# Patient Record
Sex: FEMALE | Race: WHITE
[De-identification: names, ages, dates, MRNs, and addresses within clinical notes are randomized per-mention and may not be internally consistent; named-entity substitution may affect disease eponyms.]

---

## 2018-05-09 ENCOUNTER — HOSPITAL ENCOUNTER (EMERGENCY)
Dept: HOSPITAL 94 - ER | Age: 63
Discharge: HOME | End: 2018-05-09
Payer: MEDICAID

## 2018-05-09 VITALS — DIASTOLIC BLOOD PRESSURE: 96 MMHG | SYSTOLIC BLOOD PRESSURE: 148 MMHG

## 2018-05-09 VITALS — BODY MASS INDEX: 20.81 KG/M2 | HEIGHT: 61 IN | WEIGHT: 110.23 LBS

## 2018-05-09 DIAGNOSIS — F07.81: Primary | ICD-10-CM

## 2018-05-09 DIAGNOSIS — Z56.0: ICD-10-CM

## 2018-05-09 DIAGNOSIS — Z88.5: ICD-10-CM

## 2018-05-09 DIAGNOSIS — Z79.899: ICD-10-CM

## 2018-05-09 DIAGNOSIS — Z98.890: ICD-10-CM

## 2018-05-09 PROCEDURE — 99284 EMERGENCY DEPT VISIT MOD MDM: CPT

## 2018-05-09 PROCEDURE — 73000 X-RAY EXAM OF COLLAR BONE: CPT

## 2018-05-09 PROCEDURE — 70450 CT HEAD/BRAIN W/O DYE: CPT

## 2018-05-09 SDOH — ECONOMIC STABILITY - INCOME SECURITY: UNEMPLOYMENT, UNSPECIFIED: Z56.0

## 2019-10-29 ENCOUNTER — HOSPITAL ENCOUNTER (EMERGENCY)
Dept: HOSPITAL 94 - ER | Age: 64
Discharge: HOME | End: 2019-10-29
Payer: MEDICAID

## 2019-10-29 VITALS — HEIGHT: 59 IN | WEIGHT: 110.23 LBS | BODY MASS INDEX: 22.22 KG/M2

## 2019-10-29 VITALS — DIASTOLIC BLOOD PRESSURE: 82 MMHG | SYSTOLIC BLOOD PRESSURE: 150 MMHG
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
## 2019-10-29 DIAGNOSIS — F10.99: ICD-10-CM
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
## 2019-10-29 DIAGNOSIS — Z79.899: ICD-10-CM

## 2019-10-29 DIAGNOSIS — S82.092A: Primary | ICD-10-CM
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
## 2019-10-29 DIAGNOSIS — Z56.0: ICD-10-CM

## 2019-10-29 DIAGNOSIS — Z88.5: ICD-10-CM
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
## 2019-10-29 DIAGNOSIS — S60.211A: ICD-10-CM

## 2019-10-29 PROCEDURE — 99284 EMERGENCY DEPT VISIT MOD MDM: CPT

## 2019-10-29 PROCEDURE — 73564 X-RAY EXAM KNEE 4 OR MORE: CPT

## 2019-10-29 PROCEDURE — 29505 APPLICATION LONG LEG SPLINT: CPT

## 2019-10-29 SDOH — ECONOMIC STABILITY - INCOME SECURITY: UNEMPLOYMENT, UNSPECIFIED: Z56.0

## 2019-11-12 ENCOUNTER — HOSPITAL ENCOUNTER (OUTPATIENT)
Dept: HOSPITAL 94 - ORTHO | Age: 64
Discharge: HOME | End: 2019-11-12
Attending: ORTHOPAEDIC SURGERY
Payer: MEDICAID

## 2019-11-12 DIAGNOSIS — F17.200: ICD-10-CM
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
## 2019-11-12 DIAGNOSIS — M11.262: ICD-10-CM

## 2019-11-12 DIAGNOSIS — S82.032D: Primary | ICD-10-CM

## 2019-11-12 DIAGNOSIS — I10: ICD-10-CM

## 2019-11-12 PROCEDURE — 73564 X-RAY EXAM KNEE 4 OR MORE: CPT

## 2019-12-17 ENCOUNTER — HOSPITAL ENCOUNTER (OUTPATIENT)
Dept: HOSPITAL 94 - ORTHO | Age: 64
Discharge: HOME | End: 2019-12-17
Attending: ORTHOPAEDIC SURGERY
Payer: MEDICAID

## 2019-12-17 DIAGNOSIS — S82.032D: Primary | ICD-10-CM

## 2019-12-17 DIAGNOSIS — F17.200: ICD-10-CM
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
## 2019-12-17 DIAGNOSIS — I10: ICD-10-CM

## 2019-12-17 DIAGNOSIS — M11.262: ICD-10-CM

## 2019-12-17 PROCEDURE — 73564 X-RAY EXAM KNEE 4 OR MORE: CPT

## 2020-01-14 ENCOUNTER — HOSPITAL ENCOUNTER (OUTPATIENT)
Dept: HOSPITAL 94 - ORTHO | Age: 65
Discharge: HOME | End: 2020-01-14
Attending: ORTHOPAEDIC SURGERY
Payer: MEDICAID
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
## 2020-01-14 DIAGNOSIS — M79.89: ICD-10-CM

## 2020-01-14 DIAGNOSIS — M17.11: ICD-10-CM

## 2020-01-14 DIAGNOSIS — S82.001D: Primary | ICD-10-CM

## 2020-01-14 DIAGNOSIS — M25.461: ICD-10-CM

## 2020-01-14 PROCEDURE — 73564 X-RAY EXAM KNEE 4 OR MORE: CPT

## 2020-08-03 LAB
ALBUMIN SERPL BCP-MCNC: 4.1 G/DL (ref 3.4–5)
ALBUMIN/GLOB SERPL: 1.3 {RATIO} (ref 1.1–1.5)
ALP SERPL-CCNC: 65 IU/L (ref 46–116)
ALT SERPL W P-5'-P-CCNC: 19 U/L (ref 30–65)
ANION GAP SERPL CALCULATED.3IONS-SCNC: 9 MMOL/L (ref 8–16)
AST SERPL W P-5'-P-CCNC: 34 U/L (ref 10–37)
BASOPHILS # BLD AUTO: 0.1 X10'3 (ref 0–0.2)
BASOPHILS NFR BLD AUTO: 0.8 % (ref 0–1)
BILIRUB SERPL-MCNC: 0.5 MG/DL (ref 0–1)
BUN SERPL-MCNC: 12 MG/DL (ref 7–18)
BUN/CREAT SERPL: 15.6 (ref 6.6–38)
CALCIUM SERPL-MCNC: 9 MG/DL (ref 8.5–10.1)
CHLORIDE SERPL-SCNC: 103 MMOL/L (ref 99–107)
CO2 SERPL-SCNC: 27.6 MMOL/L (ref 24–32)
CREAT SERPL-MCNC: 0.77 MG/DL (ref 0.4–0.9)
EOSINOPHIL # BLD AUTO: 0.1 X10'3 (ref 0–0.9)
EOSINOPHIL NFR BLD AUTO: 0.8 % (ref 0–6)
ERYTHROCYTE [DISTWIDTH] IN BLOOD BY AUTOMATED COUNT: 15.9 % (ref 11.5–14.5)
GFR SERPL CREATININE-BSD FRML MDRD: 76 ML/MIN
GLUCOSE SERPL-MCNC: 77 MG/DL (ref 70–104)
HCT VFR BLD AUTO: 39.7 % (ref 35–45)
HGB BLD-MCNC: 13.5 G/DL (ref 12–16)
LYMPHOCYTES # BLD AUTO: 1.6 X10'3 (ref 1.1–4.8)
LYMPHOCYTES NFR BLD AUTO: 25.5 % (ref 21–51)
MCH RBC QN AUTO: 35.2 PG (ref 27–31)
MCHC RBC AUTO-ENTMCNC: 33.9 G/DL (ref 33–36.5)
MCV RBC AUTO: 103.9 FL (ref 78–98)
MONOCYTES # BLD AUTO: 0.6 X10'3 (ref 0–0.9)
MONOCYTES NFR BLD AUTO: 10 % (ref 2–12)
NEUTROPHILS # BLD AUTO: 3.9 X10'3 (ref 1.8–7.7)
NEUTROPHILS NFR BLD AUTO: 62.9 % (ref 42–75)
PLATELET # BLD AUTO: 180 X10'3 (ref 140–440)
PMV BLD AUTO: 6.7 FL (ref 7.4–10.4)
POTASSIUM SERPL-SCNC: 4.4 MMOL/L (ref 3.4–5.1)
PROT SERPL-MCNC: 7.3 G/DL (ref 6.4–8.2)
RBC # BLD AUTO: 3.82 X10'6 (ref 4.2–5.6)
SODIUM SERPL-SCNC: 140 MMOL/L (ref 135–145)

## 2020-08-10 ENCOUNTER — HOSPITAL ENCOUNTER (OUTPATIENT)
Dept: HOSPITAL 94 - PAS | Age: 65
Discharge: HOME | End: 2020-08-10
Attending: ORTHOPAEDIC SURGERY
Payer: MEDICAID

## 2020-08-10 VITALS — DIASTOLIC BLOOD PRESSURE: 82 MMHG | SYSTOLIC BLOOD PRESSURE: 134 MMHG

## 2020-08-10 VITALS — DIASTOLIC BLOOD PRESSURE: 70 MMHG | SYSTOLIC BLOOD PRESSURE: 140 MMHG

## 2020-08-10 VITALS — DIASTOLIC BLOOD PRESSURE: 82 MMHG | SYSTOLIC BLOOD PRESSURE: 131 MMHG

## 2020-08-10 VITALS — DIASTOLIC BLOOD PRESSURE: 75 MMHG | SYSTOLIC BLOOD PRESSURE: 141 MMHG

## 2020-08-10 VITALS — HEIGHT: 61 IN | WEIGHT: 103 LBS | BODY MASS INDEX: 19.45 KG/M2

## 2020-08-10 VITALS — SYSTOLIC BLOOD PRESSURE: 122 MMHG | DIASTOLIC BLOOD PRESSURE: 72 MMHG

## 2020-08-10 VITALS — DIASTOLIC BLOOD PRESSURE: 75 MMHG | SYSTOLIC BLOOD PRESSURE: 140 MMHG

## 2020-08-10 VITALS — DIASTOLIC BLOOD PRESSURE: 88 MMHG | SYSTOLIC BLOOD PRESSURE: 133 MMHG

## 2020-08-10 VITALS — DIASTOLIC BLOOD PRESSURE: 78 MMHG | SYSTOLIC BLOOD PRESSURE: 140 MMHG

## 2020-08-10 VITALS — DIASTOLIC BLOOD PRESSURE: 87 MMHG | SYSTOLIC BLOOD PRESSURE: 136 MMHG

## 2020-08-10 VITALS — SYSTOLIC BLOOD PRESSURE: 130 MMHG | DIASTOLIC BLOOD PRESSURE: 80 MMHG

## 2020-08-10 VITALS — DIASTOLIC BLOOD PRESSURE: 80 MMHG | SYSTOLIC BLOOD PRESSURE: 135 MMHG

## 2020-08-10 VITALS — SYSTOLIC BLOOD PRESSURE: 138 MMHG | DIASTOLIC BLOOD PRESSURE: 76 MMHG

## 2020-08-10 VITALS — SYSTOLIC BLOOD PRESSURE: 141 MMHG | DIASTOLIC BLOOD PRESSURE: 75 MMHG

## 2020-08-10 DIAGNOSIS — Z11.59: ICD-10-CM

## 2020-08-10 DIAGNOSIS — Z87.891: ICD-10-CM

## 2020-08-10 DIAGNOSIS — Z79.899: ICD-10-CM
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
## 2020-08-10 DIAGNOSIS — M67.431: Primary | ICD-10-CM

## 2020-08-10 DIAGNOSIS — Z88.5: ICD-10-CM

## 2020-08-10 DIAGNOSIS — Z72.89: ICD-10-CM

## 2020-08-10 DIAGNOSIS — I97.620: ICD-10-CM
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
## 2020-08-10 DIAGNOSIS — Z98.890: ICD-10-CM

## 2020-08-10 PROCEDURE — 25111 REMOVE WRIST TENDON LESION: CPT

## 2020-08-10 PROCEDURE — 36415 COLL VENOUS BLD VENIPUNCTURE: CPT

## 2020-08-10 PROCEDURE — 35860 EXPLORE LIMB VESSELS: CPT

## 2020-08-10 PROCEDURE — 85025 COMPLETE CBC W/AUTO DIFF WBC: CPT

## 2020-08-10 PROCEDURE — 82948 REAGENT STRIP/BLOOD GLUCOSE: CPT

## 2020-08-10 PROCEDURE — 80053 COMPREHEN METABOLIC PANEL: CPT

## 2020-08-10 PROCEDURE — 93005 ELECTROCARDIOGRAM TRACING: CPT

## 2020-08-10 NOTE — NUR
BACK FROM OR WITH DR TRIP AT SIDE. VSS, ORAL AIRWAY IN PLACE. RT WRIST DSG CDI. FINGERS WARM 
WITH BRISK CAP REFILL. VERY SLEEPY AT THIS TIME.

## 2020-08-10 NOTE — NUR
DR BOWERS BACK IN TO SEE PT.  UNDRESSED ARM AND DECIDED TO TAKE THE PT BACK TO THE OR.  
CONSENT SIGNED,DR TRIP AT SIDE TO OR AT [id number]

## 2020-08-10 NOTE — NUR
BLEEING THROUGH DRESSING AGAIN. ARM RAISED. PRESSURE HELD UNTIL DR BOWERS CAME.  REINFORCED 
DRESSING THIS TIME. ELEVATED ON TWO PILLOW, ICE PRESENT. VSS, NO PAIN. WILL HOLD PT UNTIL DR BOWERS COMES BACK TO RECHECK.

## 2020-08-10 NOTE — NUR
Received from OR via [facility], accompanied by Anesthesiologist DR Dailey report given 
by Anesthesiolgist. FINGERS MOVE, ARE WARM WITH A BRISK CAP REFILL. VSS, NO PAIN.DSG CDI

## 2020-08-10 NOTE — NUR
dressing remains dry.  pt awake, ready to go home.  vss, iv out with cath intact.  fingers 
warm with 2 second cap refill.transported to lobby with bf driving her home.  given 
instructions, stated understands.

## 2020-08-10 NOTE — NUR
PTS DSG BECAME SATURATED OVER A PERIOD OF ABOUT THIRTY MINUTES. DR BOWERS AT BEDSIDE. DSG 
REMOVED AND NEW DRESSING APPLIED BY HIM.  ARM IS ELEVATED, AND ICE IS PRESENT.

## 2021-01-11 ENCOUNTER — HOSPITAL ENCOUNTER (EMERGENCY)
Dept: HOSPITAL 94 - ER | Age: 66
Discharge: HOME | End: 2021-01-11
Payer: MEDICAID

## 2021-01-11 VITALS — HEIGHT: 60 IN | BODY MASS INDEX: 20.86 KG/M2 | WEIGHT: 106.26 LBS

## 2021-01-11 VITALS — DIASTOLIC BLOOD PRESSURE: 90 MMHG | SYSTOLIC BLOOD PRESSURE: 155 MMHG

## 2021-01-11 DIAGNOSIS — Z98.890: ICD-10-CM

## 2021-01-11 DIAGNOSIS — R00.2: Primary | ICD-10-CM

## 2021-01-11 DIAGNOSIS — Z79.899: ICD-10-CM

## 2021-01-11 DIAGNOSIS — Z87.01: ICD-10-CM

## 2021-01-11 DIAGNOSIS — Z98.891: ICD-10-CM

## 2021-01-11 DIAGNOSIS — Z72.89: ICD-10-CM

## 2021-01-11 DIAGNOSIS — Z88.8: ICD-10-CM

## 2021-01-11 DIAGNOSIS — Z56.0: ICD-10-CM

## 2021-01-11 PROCEDURE — 93005 ELECTROCARDIOGRAM TRACING: CPT

## 2021-01-11 PROCEDURE — 99283 EMERGENCY DEPT VISIT LOW MDM: CPT

## 2021-01-11 SDOH — ECONOMIC STABILITY - INCOME SECURITY: UNEMPLOYMENT, UNSPECIFIED: Z56.0

## 2021-10-15 ENCOUNTER — HOSPITAL ENCOUNTER (INPATIENT)
Dept: HOSPITAL 94 - ER | Age: 66
LOS: 1 days | Discharge: HOME | DRG: 137 | End: 2021-10-16
Attending: INTERNAL MEDICINE | Admitting: SPECIALIST
Payer: MEDICAID

## 2021-10-15 VITALS — HEIGHT: 60 IN | BODY MASS INDEX: 21.64 KG/M2 | WEIGHT: 110.23 LBS

## 2021-10-15 DIAGNOSIS — I26.99: ICD-10-CM

## 2021-10-15 DIAGNOSIS — Z79.01: ICD-10-CM

## 2021-10-15 DIAGNOSIS — N20.0: ICD-10-CM

## 2021-10-15 DIAGNOSIS — Z87.891: ICD-10-CM

## 2021-10-15 DIAGNOSIS — R09.02: ICD-10-CM

## 2021-10-15 DIAGNOSIS — J90: ICD-10-CM

## 2021-10-15 DIAGNOSIS — Z56.0: ICD-10-CM

## 2021-10-15 DIAGNOSIS — J44.0: ICD-10-CM

## 2021-10-15 DIAGNOSIS — I48.0: ICD-10-CM

## 2021-10-15 DIAGNOSIS — U07.1: Primary | ICD-10-CM

## 2021-10-15 DIAGNOSIS — Z79.899: ICD-10-CM

## 2021-10-15 DIAGNOSIS — Z88.5: ICD-10-CM

## 2021-10-15 DIAGNOSIS — R74.01: ICD-10-CM

## 2021-10-15 DIAGNOSIS — J12.82: ICD-10-CM

## 2021-10-15 LAB
ALBUMIN SERPL BCP-MCNC: 3.1 G/DL (ref 3.4–5)
ALBUMIN/GLOB SERPL: 0.8 {RATIO} (ref 1.1–1.5)
ALP SERPL-CCNC: 104 IU/L (ref 46–116)
ALT SERPL W P-5'-P-CCNC: 181 U/L (ref 12–78)
ANION GAP SERPL CALCULATED.3IONS-SCNC: 14 MMOL/L (ref 8–16)
AST SERPL W P-5'-P-CCNC: 363 U/L (ref 10–37)
BASOPHILS # BLD AUTO: 0 X10'3 (ref 0–0.2)
BASOPHILS NFR BLD AUTO: 0.4 % (ref 0–1)
BILIRUB SERPL-MCNC: 0.4 MG/DL (ref 0.1–1)
BUN SERPL-MCNC: 12 MG/DL (ref 7–18)
BUN/CREAT SERPL: 13.3 (ref 6.6–38)
CALCIUM SERPL-MCNC: 8.9 MG/DL (ref 8.5–10.1)
CHLORIDE SERPL-SCNC: 94 MMOL/L (ref 99–107)
CLARITY UR: CLEAR
CO2 SERPL-SCNC: 28.1 MMOL/L (ref 24–32)
COLOR UR: YELLOW
CREAT SERPL-MCNC: 0.9 MG/DL (ref 0.4–0.9)
EOSINOPHIL # BLD AUTO: 0 X10'3 (ref 0–0.9)
EOSINOPHIL NFR BLD AUTO: 0.3 % (ref 0–6)
ERYTHROCYTE [DISTWIDTH] IN BLOOD BY AUTOMATED COUNT: 13.4 % (ref 11.5–14.5)
GFR SERPL CREATININE-BSD FRML MDRD: 63 ML/MIN
GLUCOSE SERPL-MCNC: 171 MG/DL (ref 70–104)
GLUCOSE UR STRIP-MCNC: NEGATIVE MG/DL
HCT VFR BLD AUTO: 40.8 % (ref 35–45)
HGB BLD-MCNC: 13.6 G/DL (ref 12–16)
HGB UR QL STRIP: NEGATIVE
KETONES UR STRIP-MCNC: 80 MG/DL
LEUKOCYTE ESTERASE UR QL STRIP: NEGATIVE
LYMPHOCYTES # BLD AUTO: 0.6 X10'3 (ref 1.1–4.8)
LYMPHOCYTES NFR BLD AUTO: 15.8 % (ref 21–51)
MCH RBC QN AUTO: 36.4 PG (ref 27–31)
MCHC RBC AUTO-ENTMCNC: 33.3 G/DL (ref 33–36.5)
MCV RBC AUTO: 109.5 FL (ref 78–98)
MONOCYTES # BLD AUTO: 0.5 X10'3 (ref 0–0.9)
MONOCYTES NFR BLD AUTO: 14.1 % (ref 2–12)
NEUTROPHILS # BLD AUTO: 2.7 X10'3 (ref 1.8–7.7)
NEUTROPHILS NFR BLD AUTO: 69.4 % (ref 42–75)
NITRITE UR QL STRIP: NEGATIVE
PH UR STRIP: 8 [PH] (ref 4.8–8)
PLATELET # BLD AUTO: 124 X10'3 (ref 140–440)
PMV BLD AUTO: 7.9 FL (ref 7.4–10.4)
POTASSIUM SERPL-SCNC: 4 MMOL/L (ref 3.5–5.1)
PROT SERPL-MCNC: 6.9 G/DL (ref 6.4–8.2)
PROT UR QL STRIP: NEGATIVE MG/DL
RBC # BLD AUTO: 3.73 X10'6 (ref 4.2–5.6)
SODIUM SERPL-SCNC: 136 MMOL/L (ref 135–145)
SP GR UR STRIP: 1.01 (ref 1–1.03)
URN COLLECT METHOD CLASS: (no result)
UROBILINOGEN UR STRIP-MCNC: 0.2 E.U/DL (ref 0.2–1)
WBC # BLD AUTO: 3.9 X10'3 (ref 4.5–11)

## 2021-10-15 PROCEDURE — 85025 COMPLETE CBC W/AUTO DIFF WBC: CPT

## 2021-10-15 PROCEDURE — 80053 COMPREHEN METABOLIC PANEL: CPT

## 2021-10-15 PROCEDURE — 87081 CULTURE SCREEN ONLY: CPT

## 2021-10-15 PROCEDURE — 87635 SARS-COV-2 COVID-19 AMP PRB: CPT

## 2021-10-15 PROCEDURE — XW033E5 INTRODUCTION OF REMDESIVIR ANTI-INFECTIVE INTO PERIPHERAL VEIN, PERCUTANEOUS APPROACH, NEW TECHNOLOGY GROUP 5: ICD-10-PCS | Performed by: SPECIALIST

## 2021-10-15 PROCEDURE — 83880 ASSAY OF NATRIURETIC PEPTIDE: CPT

## 2021-10-15 PROCEDURE — 71046 X-RAY EXAM CHEST 2 VIEWS: CPT

## 2021-10-15 PROCEDURE — B32T1ZZ COMPUTERIZED TOMOGRAPHY (CT SCAN) OF LEFT PULMONARY ARTERY USING LOW OSMOLAR CONTRAST: ICD-10-PCS

## 2021-10-15 PROCEDURE — B32S1ZZ COMPUTERIZED TOMOGRAPHY (CT SCAN) OF RIGHT PULMONARY ARTERY USING LOW OSMOLAR CONTRAST: ICD-10-PCS

## 2021-10-15 PROCEDURE — B3201ZZ COMPUTERIZED TOMOGRAPHY (CT SCAN) OF THORACIC AORTA USING LOW OSMOLAR CONTRAST: ICD-10-PCS

## 2021-10-15 PROCEDURE — 85007 BL SMEAR W/DIFF WBC COUNT: CPT

## 2021-10-15 PROCEDURE — 96365 THER/PROPH/DIAG IV INF INIT: CPT

## 2021-10-15 PROCEDURE — 71275 CT ANGIOGRAPHY CHEST: CPT

## 2021-10-15 PROCEDURE — 99285 EMERGENCY DEPT VISIT HI MDM: CPT

## 2021-10-15 PROCEDURE — 36415 COLL VENOUS BLD VENIPUNCTURE: CPT

## 2021-10-15 PROCEDURE — 83605 ASSAY OF LACTIC ACID: CPT

## 2021-10-15 PROCEDURE — 87040 BLOOD CULTURE FOR BACTERIA: CPT

## 2021-10-15 PROCEDURE — 96372 THER/PROPH/DIAG INJ SC/IM: CPT

## 2021-10-15 PROCEDURE — 84145 PROCALCITONIN (PCT): CPT

## 2021-10-15 PROCEDURE — 81003 URINALYSIS AUTO W/O SCOPE: CPT

## 2021-10-15 RX ADMIN — DEXTROSE SCH MLS/HR: 50 INJECTION, SOLUTION INTRAVENOUS at 21:20

## 2021-10-15 RX ADMIN — DOCUSATE SODIUM SCH MG: 100 CAPSULE, LIQUID FILLED ORAL at 20:00

## 2021-10-15 SDOH — ECONOMIC STABILITY - INCOME SECURITY: UNEMPLOYMENT, UNSPECIFIED: Z56.0

## 2021-10-16 VITALS — DIASTOLIC BLOOD PRESSURE: 67 MMHG | SYSTOLIC BLOOD PRESSURE: 127 MMHG

## 2021-10-16 VITALS — DIASTOLIC BLOOD PRESSURE: 78 MMHG | SYSTOLIC BLOOD PRESSURE: 136 MMHG

## 2021-10-16 LAB
ALBUMIN SERPL BCP-MCNC: 2.9 G/DL (ref 3.4–5)
ALBUMIN/GLOB SERPL: 0.8 {RATIO} (ref 1.1–1.5)
ALP SERPL-CCNC: 94 IU/L (ref 46–116)
ALT SERPL W P-5'-P-CCNC: 146 U/L (ref 12–78)
ANION GAP SERPL CALCULATED.3IONS-SCNC: 14 MMOL/L (ref 8–16)
ANISOCYTOSIS BLD QL SMEAR: (no result)
AST SERPL W P-5'-P-CCNC: 239 U/L (ref 10–37)
BASOPHILS # BLD AUTO: 0 X10'3 (ref 0–0.2)
BASOPHILS NFR BLD AUTO: 0.3 % (ref 0–1)
BILIRUB SERPL-MCNC: 0.3 MG/DL (ref 0.1–1)
BUN SERPL-MCNC: 9 MG/DL (ref 7–18)
BUN/CREAT SERPL: 14.5 (ref 6.6–38)
CALCIUM SERPL-MCNC: 8.6 MG/DL (ref 8.5–10.1)
CHLORIDE SERPL-SCNC: 97 MMOL/L (ref 99–107)
CO2 SERPL-SCNC: 25.9 MMOL/L (ref 24–32)
CREAT SERPL-MCNC: 0.62 MG/DL (ref 0.4–0.9)
EOSINOPHIL # BLD AUTO: 0 X10'3 (ref 0–0.9)
EOSINOPHIL NFR BLD AUTO: 0 % (ref 0–6)
ERYTHROCYTE [DISTWIDTH] IN BLOOD BY AUTOMATED COUNT: 13.2 % (ref 11.5–14.5)
GFR SERPL CREATININE-BSD FRML MDRD: > 90 ML/MIN
GLUCOSE SERPL-MCNC: 147 MG/DL (ref 70–104)
HCT VFR BLD AUTO: 36.5 % (ref 35–45)
HGB BLD-MCNC: 12.4 G/DL (ref 12–16)
LYMPHOCYTES # BLD AUTO: 0.4 X10'3 (ref 1.1–4.8)
LYMPHOCYTES NFR BLD AUTO: 15.2 % (ref 21–51)
LYMPHOCYTES NFR BLD MANUAL: 13 % (ref 21–51)
MACROCYTES BLD QL SMEAR: (no result)
MCH RBC QN AUTO: 36.9 PG (ref 27–31)
MCHC RBC AUTO-ENTMCNC: 33.9 G/DL (ref 33–36.5)
MCV RBC AUTO: 108.7 FL (ref 78–98)
MONOCYTES # BLD AUTO: 0.4 X10'3 (ref 0–0.9)
MONOCYTES NFR BLD AUTO: 14.2 % (ref 2–12)
MONOCYTES NFR BLD MANUAL: 15 % (ref 2–12)
NEUTROPHILS # BLD AUTO: 1.9 X10'3 (ref 1.8–7.7)
NEUTROPHILS NFR BLD AUTO: 70.3 % (ref 42–75)
NEUTS BAND # BLD MANUAL: 5 % (ref 0–10)
NEUTS SEG NFR BLD MANUAL: 67 % (ref 42–75)
PLATELET # BLD AUTO: 117 X10'3 (ref 140–440)
PLATELET BLD QL SMEAR: (no result)
PMV BLD AUTO: 8.5 FL (ref 7.4–10.4)
POTASSIUM SERPL-SCNC: 4.2 MMOL/L (ref 3.5–5.1)
PROT SERPL-MCNC: 6.5 G/DL (ref 6.4–8.2)
RBC # BLD AUTO: 3.36 X10'6 (ref 4.2–5.6)
RBC MORPH BLD: (no result)
SODIUM SERPL-SCNC: 137 MMOL/L (ref 135–145)
TOTAL CELLS COUNTED FLD: 100
WBC # BLD AUTO: 2.7 X10'3 (ref 4.5–11)

## 2021-10-16 RX ADMIN — DOCUSATE SODIUM SCH MG: 100 CAPSULE, LIQUID FILLED ORAL at 08:45

## 2021-10-16 RX ADMIN — DEXTROSE SCH MLS/HR: 50 INJECTION, SOLUTION INTRAVENOUS at 08:45

## 2021-10-16 NOTE — NUR
PT DISCHARGED IN STABLE CONDITION. 92% RA WHILE AMBULATING. LEFT IN PRIVATE VEHICLE WITH 
DAUGHTER. IV DC [facility] INTACT. FOLLOW UP INSTRUCTIONS GIVEN, ALL QUESTIONS ANSWERED. ALL 
BELONGINGS IN HAND. 

-------------------------------------------------------------------------------

Addendum: 10/16/21 at 1540 by Norma Ross RN

-------------------------------------------------------------------------------

Amended: Links added.

## 2021-10-28 ENCOUNTER — HOSPITAL ENCOUNTER (INPATIENT)
Dept: HOSPITAL 94 - ER | Age: 66
LOS: 3 days | Discharge: HOME | DRG: 241 | End: 2021-10-31
Attending: HOSPITALIST | Admitting: HOSPITALIST
Payer: MEDICAID

## 2021-10-28 VITALS — WEIGHT: 110.23 LBS | BODY MASS INDEX: 22.22 KG/M2 | HEIGHT: 59 IN

## 2021-10-28 DIAGNOSIS — K21.01: ICD-10-CM

## 2021-10-28 DIAGNOSIS — K70.10: ICD-10-CM

## 2021-10-28 DIAGNOSIS — F10.20: ICD-10-CM

## 2021-10-28 DIAGNOSIS — K57.31: ICD-10-CM

## 2021-10-28 DIAGNOSIS — K29.71: Primary | ICD-10-CM

## 2021-10-28 DIAGNOSIS — E87.2: ICD-10-CM

## 2021-10-28 DIAGNOSIS — D12.3: ICD-10-CM

## 2021-10-28 DIAGNOSIS — I10: ICD-10-CM

## 2021-10-28 DIAGNOSIS — K80.20: ICD-10-CM

## 2021-10-28 DIAGNOSIS — E11.9: ICD-10-CM

## 2021-10-28 DIAGNOSIS — D62: ICD-10-CM

## 2021-10-28 DIAGNOSIS — K82.8: ICD-10-CM

## 2021-10-28 DIAGNOSIS — J90: ICD-10-CM

## 2021-10-28 DIAGNOSIS — Z79.899: ICD-10-CM

## 2021-10-28 DIAGNOSIS — K76.89: ICD-10-CM

## 2021-10-28 DIAGNOSIS — Z86.711: ICD-10-CM

## 2021-10-28 DIAGNOSIS — Z88.5: ICD-10-CM

## 2021-10-28 DIAGNOSIS — Z87.891: ICD-10-CM

## 2021-10-28 DIAGNOSIS — A09: ICD-10-CM

## 2021-10-28 DIAGNOSIS — D12.5: ICD-10-CM

## 2021-10-28 DIAGNOSIS — Z86.16: ICD-10-CM

## 2021-10-28 DIAGNOSIS — Z87.01: ICD-10-CM

## 2021-10-28 DIAGNOSIS — N17.0: ICD-10-CM

## 2021-10-28 DIAGNOSIS — K44.9: ICD-10-CM

## 2021-10-28 LAB
ALBUMIN SERPL BCP-MCNC: 3.4 G/DL (ref 3.4–5)
ALBUMIN/GLOB SERPL: 0.8 {RATIO} (ref 1.1–1.5)
ALP SERPL-CCNC: 89 IU/L (ref 46–116)
ALT SERPL W P-5'-P-CCNC: 2047 U/L (ref 12–78)
ANION GAP SERPL CALCULATED.3IONS-SCNC: 16 MMOL/L (ref 8–16)
APTT PPP: 33 SECONDS (ref 22–32)
AST SERPL W P-5'-P-CCNC: 5988 U/L (ref 10–37)
BACTERIA URNS QL MICRO: (no result) /HPF
BASOPHILS # BLD AUTO: 0.1 X10'3 (ref 0–0.2)
BASOPHILS NFR BLD AUTO: 0.6 % (ref 0–1)
BILIRUB SERPL-MCNC: 0.4 MG/DL (ref 0.1–1)
BUN SERPL-MCNC: 27 MG/DL (ref 7–18)
BUN/CREAT SERPL: 11.4 (ref 6.6–38)
CALCIUM SERPL-MCNC: 10 MG/DL (ref 8.5–10.1)
CAOX CRY URNS QL MICRO: (no result) /HPF
CHLORIDE SERPL-SCNC: 98 MMOL/L (ref 99–107)
CLARITY UR: (no result)
CO2 SERPL-SCNC: 28 MMOL/L (ref 24–32)
COLOR UR: (no result)
CREAT SERPL-MCNC: 2.36 MG/DL (ref 0.4–0.9)
DEPRECATED SQUAMOUS URNS QL MICRO: (no result) /LPF
EOSINOPHIL # BLD AUTO: 0 X10'3 (ref 0–0.9)
EOSINOPHIL NFR BLD AUTO: 0.1 % (ref 0–6)
ERYTHROCYTE [DISTWIDTH] IN BLOOD BY AUTOMATED COUNT: 13.6 % (ref 11.5–14.5)
GFR SERPL CREATININE-BSD FRML MDRD: 21 ML/MIN
GLUCOSE SERPL-MCNC: 134 MG/DL (ref 70–104)
GLUCOSE UR STRIP-MCNC: NEGATIVE MG/DL
HCT VFR BLD AUTO: 37.3 % (ref 35–45)
HGB BLD-MCNC: 12.6 G/DL (ref 12–16)
HGB UR QL STRIP: (no result)
KETONES UR STRIP-MCNC: NEGATIVE MG/DL
LEUKOCYTE ESTERASE UR QL STRIP: NEGATIVE
LYMPHOCYTES # BLD AUTO: 1.1 X10'3 (ref 1.1–4.8)
LYMPHOCYTES NFR BLD AUTO: 6.2 % (ref 21–51)
MAGNESIUM SERPL-MCNC: 1.9 MG/DL (ref 1.5–2.4)
MCH RBC QN AUTO: 36.2 PG (ref 27–31)
MCHC RBC AUTO-ENTMCNC: 33.8 G/DL (ref 33–36.5)
MCV RBC AUTO: 107.1 FL (ref 78–98)
MONOCYTES # BLD AUTO: 0.9 X10'3 (ref 0–0.9)
MONOCYTES NFR BLD AUTO: 5.3 % (ref 2–12)
MUCOUS THREADS URNS QL MICRO: (no result) /LPF
NEUTROPHILS # BLD AUTO: 15.7 X10'3 (ref 1.8–7.7)
NEUTROPHILS NFR BLD AUTO: 87.8 % (ref 42–75)
NITRITE UR QL STRIP: NEGATIVE
PH UR STRIP: 6 [PH] (ref 4.8–8)
PLATELET # BLD AUTO: 424 X10'3 (ref 140–440)
PMV BLD AUTO: 7.5 FL (ref 7.4–10.4)
POTASSIUM SERPL-SCNC: 3.9 MMOL/L (ref 3.5–5.1)
PROT SERPL-MCNC: 7.5 G/DL (ref 6.4–8.2)
PROT UR QL STRIP: 100 MG/DL
RBC # BLD AUTO: 3.48 X10'6 (ref 4.2–5.6)
RBC #/AREA URNS HPF: (no result) /HPF (ref 0–2)
RENAL EPI CELLS URNS QL MICRO: (no result) /HPF
SODIUM SERPL-SCNC: 142 MMOL/L (ref 135–145)
SP GR UR STRIP: 1.02 (ref 1–1.03)
TRANS CELLS URNS QL MICRO: (no result) /HPF
URN COLLECT METHOD CLASS: (no result)
UROBILINOGEN UR STRIP-MCNC: 0.2 E.U/DL (ref 0.2–1)
WBC # BLD AUTO: 17.8 X10'3 (ref 4.5–11)
WBC #/AREA URNS HPF: (no result) /HPF (ref 0–4)

## 2021-10-28 PROCEDURE — 80053 COMPREHEN METABOLIC PANEL: CPT

## 2021-10-28 PROCEDURE — 96375 TX/PRO/DX INJ NEW DRUG ADDON: CPT

## 2021-10-28 PROCEDURE — 85025 COMPLETE CBC W/AUTO DIFF WBC: CPT

## 2021-10-28 PROCEDURE — 43239 EGD BIOPSY SINGLE/MULTIPLE: CPT

## 2021-10-28 PROCEDURE — 71250 CT THORAX DX C-: CPT

## 2021-10-28 PROCEDURE — 85730 THROMBOPLASTIN TIME PARTIAL: CPT

## 2021-10-28 PROCEDURE — 99153 MOD SED SAME PHYS/QHP EA: CPT

## 2021-10-28 PROCEDURE — 85610 PROTHROMBIN TIME: CPT

## 2021-10-28 PROCEDURE — 80048 BASIC METABOLIC PNL TOTAL CA: CPT

## 2021-10-28 PROCEDURE — 81001 URINALYSIS AUTO W/SCOPE: CPT

## 2021-10-28 PROCEDURE — 83880 ASSAY OF NATRIURETIC PEPTIDE: CPT

## 2021-10-28 PROCEDURE — 83605 ASSAY OF LACTIC ACID: CPT

## 2021-10-28 PROCEDURE — 87040 BLOOD CULTURE FOR BACTERIA: CPT

## 2021-10-28 PROCEDURE — 83735 ASSAY OF MAGNESIUM: CPT

## 2021-10-28 PROCEDURE — 80305 DRUG TEST PRSMV DIR OPT OBS: CPT

## 2021-10-28 PROCEDURE — 71045 X-RAY EXAM CHEST 1 VIEW: CPT

## 2021-10-28 PROCEDURE — 45385 COLONOSCOPY W/LESION REMOVAL: CPT

## 2021-10-28 PROCEDURE — 96365 THER/PROPH/DIAG IV INF INIT: CPT

## 2021-10-28 PROCEDURE — 99285 EMERGENCY DEPT VISIT HI MDM: CPT

## 2021-10-28 PROCEDURE — 80320 DRUG SCREEN QUANTALCOHOLS: CPT

## 2021-10-28 PROCEDURE — 93005 ELECTROCARDIOGRAM TRACING: CPT

## 2021-10-28 PROCEDURE — 99152 MOD SED SAME PHYS/QHP 5/>YRS: CPT

## 2021-10-28 PROCEDURE — 84145 PROCALCITONIN (PCT): CPT

## 2021-10-28 PROCEDURE — 36415 COLL VENOUS BLD VENIPUNCTURE: CPT

## 2021-10-28 PROCEDURE — 80076 HEPATIC FUNCTION PANEL: CPT

## 2021-10-28 RX ADMIN — SODIUM CHLORIDE SCH MLS/HR: 9 INJECTION INTRAMUSCULAR; INTRAVENOUS; SUBCUTANEOUS at 18:38

## 2021-10-28 RX ADMIN — DOCUSATE SODIUM SCH MG: 100 CAPSULE, LIQUID FILLED ORAL at 18:26

## 2021-10-29 VITALS — SYSTOLIC BLOOD PRESSURE: 131 MMHG | DIASTOLIC BLOOD PRESSURE: 79 MMHG

## 2021-10-29 VITALS — DIASTOLIC BLOOD PRESSURE: 182 MMHG | SYSTOLIC BLOOD PRESSURE: 139 MMHG

## 2021-10-29 VITALS — DIASTOLIC BLOOD PRESSURE: 84 MMHG | SYSTOLIC BLOOD PRESSURE: 145 MMHG

## 2021-10-29 VITALS — SYSTOLIC BLOOD PRESSURE: 130 MMHG | DIASTOLIC BLOOD PRESSURE: 83 MMHG

## 2021-10-29 LAB
ALBUMIN SERPL BCP-MCNC: 2.7 G/DL (ref 3.4–5)
ALBUMIN/GLOB SERPL: 0.8 {RATIO} (ref 1.1–1.5)
ALP SERPL-CCNC: 60 IU/L (ref 46–116)
ALT SERPL W P-5'-P-CCNC: 1394 U/L (ref 12–78)
AMPHETAMINES UR QL SCN: NEGATIVE
ANION GAP SERPL CALCULATED.3IONS-SCNC: 8 MMOL/L (ref 8–16)
AST SERPL W P-5'-P-CCNC: 2438 U/L (ref 10–37)
BARBITURATES UR QL SCN: NEGATIVE
BASOPHILS # BLD AUTO: 0.1 X10'3 (ref 0–0.2)
BASOPHILS NFR BLD AUTO: 1.2 % (ref 0–1)
BENZODIAZ UR QL SCN: NEGATIVE
BILIRUB DIRECT SERPL-MCNC: 0.1 MG/DL (ref 0–0.3)
BILIRUB SERPL-MCNC: 0.3 MG/DL (ref 0.1–1)
BUN SERPL-MCNC: 30 MG/DL (ref 7–18)
BUN/CREAT SERPL: 19.7 (ref 6.6–38)
BZE UR QL SCN: NEGATIVE
CALCIUM SERPL-MCNC: 8.8 MG/DL (ref 8.5–10.1)
CANNABINOIDS UR QL SCN: NEGATIVE
CHLORIDE SERPL-SCNC: 106 MMOL/L (ref 99–107)
CO2 SERPL-SCNC: 26.9 MMOL/L (ref 24–32)
CREAT SERPL-MCNC: 1.52 MG/DL (ref 0.4–0.9)
EOSINOPHIL # BLD AUTO: 0.1 X10'3 (ref 0–0.9)
EOSINOPHIL NFR BLD AUTO: 0.7 % (ref 0–6)
ERYTHROCYTE [DISTWIDTH] IN BLOOD BY AUTOMATED COUNT: 13.4 % (ref 11.5–14.5)
GFR SERPL CREATININE-BSD FRML MDRD: 34 ML/MIN
GLUCOSE SERPL-MCNC: 85 MG/DL (ref 70–104)
HCT VFR BLD AUTO: 30.9 % (ref 35–45)
HGB BLD-MCNC: 10.6 G/DL (ref 12–16)
LYMPHOCYTES # BLD AUTO: 1.9 X10'3 (ref 1.1–4.8)
LYMPHOCYTES NFR BLD AUTO: 21.8 % (ref 21–51)
MCH RBC QN AUTO: 36.8 PG (ref 27–31)
MCHC RBC AUTO-ENTMCNC: 34.2 G/DL (ref 33–36.5)
MCV RBC AUTO: 107.5 FL (ref 78–98)
METHADONE UR QL SCN: NEGATIVE
MONOCYTES # BLD AUTO: 0.6 X10'3 (ref 0–0.9)
MONOCYTES NFR BLD AUTO: 6.6 % (ref 2–12)
NEUTROPHILS # BLD AUTO: 6 X10'3 (ref 1.8–7.7)
NEUTROPHILS NFR BLD AUTO: 69.7 % (ref 42–75)
OPIATES UR QL SCN: NEGATIVE
PCP UR QL SCN: NEGATIVE
PLATELET # BLD AUTO: 292 X10'3 (ref 140–440)
PMV BLD AUTO: 7.7 FL (ref 7.4–10.4)
POTASSIUM SERPL-SCNC: 4.2 MMOL/L (ref 3.5–5.1)
PROT SERPL-MCNC: 5.9 G/DL (ref 6.4–8.2)
RBC # BLD AUTO: 2.88 X10'6 (ref 4.2–5.6)
SODIUM SERPL-SCNC: 141 MMOL/L (ref 135–145)
WBC # BLD AUTO: 8.6 X10'3 (ref 4.5–11)

## 2021-10-29 RX ADMIN — DOCUSATE SODIUM SCH MG: 100 CAPSULE, LIQUID FILLED ORAL at 09:34

## 2021-10-29 RX ADMIN — SODIUM CHLORIDE SCH MLS/HR: 9 INJECTION INTRAMUSCULAR; INTRAVENOUS; SUBCUTANEOUS at 20:37

## 2021-10-29 RX ADMIN — SODIUM CHLORIDE SCH MLS/HR: 9 INJECTION INTRAMUSCULAR; INTRAVENOUS; SUBCUTANEOUS at 07:10

## 2021-10-29 RX ADMIN — DOCUSATE SODIUM SCH MG: 100 CAPSULE, LIQUID FILLED ORAL at 20:00

## 2021-10-29 RX ADMIN — LEVOFLOXACIN SCH MLS/HR: 500 INJECTION, SOLUTION INTRAVENOUS at 09:34

## 2021-10-29 RX ADMIN — SODIUM CHLORIDE SCH MLS/HR: 9 INJECTION INTRAMUSCULAR; INTRAVENOUS; SUBCUTANEOUS at 02:51

## 2021-10-29 NOTE — NUR
Patient in room PCU 3009. I have received report from NATALIE Alfaro   and had the opportunity to 
ask questions and assume patient care.

## 2021-10-30 VITALS — DIASTOLIC BLOOD PRESSURE: 84 MMHG | SYSTOLIC BLOOD PRESSURE: 146 MMHG

## 2021-10-30 VITALS — DIASTOLIC BLOOD PRESSURE: 84 MMHG | SYSTOLIC BLOOD PRESSURE: 151 MMHG

## 2021-10-30 VITALS — SYSTOLIC BLOOD PRESSURE: 150 MMHG | DIASTOLIC BLOOD PRESSURE: 81 MMHG

## 2021-10-30 VITALS — DIASTOLIC BLOOD PRESSURE: 68 MMHG | SYSTOLIC BLOOD PRESSURE: 135 MMHG

## 2021-10-30 VITALS — SYSTOLIC BLOOD PRESSURE: 140 MMHG | DIASTOLIC BLOOD PRESSURE: 72 MMHG

## 2021-10-30 VITALS — DIASTOLIC BLOOD PRESSURE: 68 MMHG | SYSTOLIC BLOOD PRESSURE: 138 MMHG

## 2021-10-30 VITALS — DIASTOLIC BLOOD PRESSURE: 71 MMHG | SYSTOLIC BLOOD PRESSURE: 136 MMHG

## 2021-10-30 VITALS — SYSTOLIC BLOOD PRESSURE: 147 MMHG | DIASTOLIC BLOOD PRESSURE: 103 MMHG

## 2021-10-30 VITALS — SYSTOLIC BLOOD PRESSURE: 149 MMHG | DIASTOLIC BLOOD PRESSURE: 89 MMHG

## 2021-10-30 VITALS — DIASTOLIC BLOOD PRESSURE: 82 MMHG | SYSTOLIC BLOOD PRESSURE: 150 MMHG

## 2021-10-30 VITALS — DIASTOLIC BLOOD PRESSURE: 65 MMHG | SYSTOLIC BLOOD PRESSURE: 136 MMHG

## 2021-10-30 VITALS — DIASTOLIC BLOOD PRESSURE: 58 MMHG | SYSTOLIC BLOOD PRESSURE: 126 MMHG

## 2021-10-30 VITALS — SYSTOLIC BLOOD PRESSURE: 141 MMHG | DIASTOLIC BLOOD PRESSURE: 80 MMHG

## 2021-10-30 VITALS — SYSTOLIC BLOOD PRESSURE: 124 MMHG | DIASTOLIC BLOOD PRESSURE: 76 MMHG

## 2021-10-30 VITALS — DIASTOLIC BLOOD PRESSURE: 62 MMHG | SYSTOLIC BLOOD PRESSURE: 128 MMHG

## 2021-10-30 VITALS — DIASTOLIC BLOOD PRESSURE: 71 MMHG | SYSTOLIC BLOOD PRESSURE: 138 MMHG

## 2021-10-30 VITALS — SYSTOLIC BLOOD PRESSURE: 141 MMHG | DIASTOLIC BLOOD PRESSURE: 79 MMHG

## 2021-10-30 LAB
ALBUMIN SERPL BCP-MCNC: 2.4 G/DL (ref 3.4–5)
ALBUMIN/GLOB SERPL: 0.8 {RATIO} (ref 1.1–1.5)
ALP SERPL-CCNC: 57 IU/L (ref 46–116)
ALT SERPL W P-5'-P-CCNC: 745 U/L (ref 12–78)
ANION GAP SERPL CALCULATED.3IONS-SCNC: 10 MMOL/L (ref 8–16)
AST SERPL W P-5'-P-CCNC: 605 U/L (ref 10–37)
BASOPHILS # BLD AUTO: 0.1 X10'3 (ref 0–0.2)
BASOPHILS NFR BLD AUTO: 0.9 % (ref 0–1)
BILIRUB DIRECT SERPL-MCNC: 0.1 MG/DL (ref 0–0.3)
BILIRUB SERPL-MCNC: 0.3 MG/DL (ref 0.1–1)
BUN SERPL-MCNC: 15 MG/DL (ref 7–18)
BUN/CREAT SERPL: 18.5 (ref 6.6–38)
CALCIUM SERPL-MCNC: 8.1 MG/DL (ref 8.5–10.1)
CHLORIDE SERPL-SCNC: 109 MMOL/L (ref 99–107)
CO2 SERPL-SCNC: 25.5 MMOL/L (ref 24–32)
CREAT SERPL-MCNC: 0.81 MG/DL (ref 0.4–0.9)
EOSINOPHIL # BLD AUTO: 0.1 X10'3 (ref 0–0.9)
EOSINOPHIL NFR BLD AUTO: 1.4 % (ref 0–6)
ERYTHROCYTE [DISTWIDTH] IN BLOOD BY AUTOMATED COUNT: 13.3 % (ref 11.5–14.5)
GFR SERPL CREATININE-BSD FRML MDRD: 71 ML/MIN
GLUCOSE SERPL-MCNC: 89 MG/DL (ref 70–104)
HCT VFR BLD AUTO: 30 % (ref 35–45)
HGB BLD-MCNC: 10.4 G/DL (ref 12–16)
LYMPHOCYTES # BLD AUTO: 1.7 X10'3 (ref 1.1–4.8)
LYMPHOCYTES NFR BLD AUTO: 27 % (ref 21–51)
MCH RBC QN AUTO: 36.8 PG (ref 27–31)
MCHC RBC AUTO-ENTMCNC: 34.7 G/DL (ref 33–36.5)
MCV RBC AUTO: 106 FL (ref 78–98)
MONOCYTES # BLD AUTO: 0.6 X10'3 (ref 0–0.9)
MONOCYTES NFR BLD AUTO: 9.3 % (ref 2–12)
NEUTROPHILS # BLD AUTO: 3.9 X10'3 (ref 1.8–7.7)
NEUTROPHILS NFR BLD AUTO: 61.4 % (ref 42–75)
PLATELET # BLD AUTO: 253 X10'3 (ref 140–440)
PMV BLD AUTO: 7.5 FL (ref 7.4–10.4)
POTASSIUM SERPL-SCNC: 3.6 MMOL/L (ref 3.5–5.1)
PROT SERPL-MCNC: 5.4 G/DL (ref 6.4–8.2)
RBC # BLD AUTO: 2.83 X10'6 (ref 4.2–5.6)
SODIUM SERPL-SCNC: 144 MMOL/L (ref 135–145)
WBC # BLD AUTO: 6.3 X10'3 (ref 4.5–11)

## 2021-10-30 PROCEDURE — 0DB48ZX EXCISION OF ESOPHAGOGASTRIC JUNCTION, VIA NATURAL OR ARTIFICIAL OPENING ENDOSCOPIC, DIAGNOSTIC: ICD-10-PCS | Performed by: INTERNAL MEDICINE

## 2021-10-30 PROCEDURE — 0DB78ZX EXCISION OF STOMACH, PYLORUS, VIA NATURAL OR ARTIFICIAL OPENING ENDOSCOPIC, DIAGNOSTIC: ICD-10-PCS | Performed by: INTERNAL MEDICINE

## 2021-10-30 RX ADMIN — DOCUSATE SODIUM SCH MG: 100 CAPSULE, LIQUID FILLED ORAL at 19:37

## 2021-10-30 RX ADMIN — SODIUM CHLORIDE SCH MLS/HR: 9 INJECTION INTRAMUSCULAR; INTRAVENOUS; SUBCUTANEOUS at 05:59

## 2021-10-30 RX ADMIN — DOCUSATE SODIUM SCH MG: 100 CAPSULE, LIQUID FILLED ORAL at 08:00

## 2021-10-30 RX ADMIN — SODIUM CHLORIDE SCH MLS/HR: 9 INJECTION INTRAMUSCULAR; INTRAVENOUS; SUBCUTANEOUS at 19:37

## 2021-10-30 RX ADMIN — LEVOFLOXACIN SCH MLS/HR: 500 INJECTION, SOLUTION INTRAVENOUS at 08:56

## 2021-10-30 NOTE — NUR
Educated patient about the importance drinking cleansing water before colonoscopy. patient 
verbalizes understanding.

## 2021-10-30 NOTE — NUR
Problems reprioritized. Patient report given, questions answered & plan of care reviewed 
with NATALIE Kemp.

## 2021-10-31 VITALS — DIASTOLIC BLOOD PRESSURE: 69 MMHG | SYSTOLIC BLOOD PRESSURE: 144 MMHG

## 2021-10-31 VITALS — SYSTOLIC BLOOD PRESSURE: 128 MMHG | DIASTOLIC BLOOD PRESSURE: 60 MMHG

## 2021-10-31 VITALS — SYSTOLIC BLOOD PRESSURE: 126 MMHG | DIASTOLIC BLOOD PRESSURE: 71 MMHG

## 2021-10-31 VITALS — DIASTOLIC BLOOD PRESSURE: 87 MMHG | SYSTOLIC BLOOD PRESSURE: 141 MMHG

## 2021-10-31 VITALS — DIASTOLIC BLOOD PRESSURE: 76 MMHG | SYSTOLIC BLOOD PRESSURE: 131 MMHG

## 2021-10-31 VITALS — DIASTOLIC BLOOD PRESSURE: 85 MMHG | SYSTOLIC BLOOD PRESSURE: 148 MMHG

## 2021-10-31 VITALS — DIASTOLIC BLOOD PRESSURE: 71 MMHG | SYSTOLIC BLOOD PRESSURE: 126 MMHG

## 2021-10-31 VITALS — SYSTOLIC BLOOD PRESSURE: 131 MMHG | DIASTOLIC BLOOD PRESSURE: 65 MMHG

## 2021-10-31 VITALS — DIASTOLIC BLOOD PRESSURE: 75 MMHG | SYSTOLIC BLOOD PRESSURE: 146 MMHG

## 2021-10-31 VITALS — SYSTOLIC BLOOD PRESSURE: 144 MMHG | DIASTOLIC BLOOD PRESSURE: 85 MMHG

## 2021-10-31 VITALS — DIASTOLIC BLOOD PRESSURE: 82 MMHG | SYSTOLIC BLOOD PRESSURE: 151 MMHG

## 2021-10-31 VITALS — DIASTOLIC BLOOD PRESSURE: 68 MMHG | SYSTOLIC BLOOD PRESSURE: 114 MMHG

## 2021-10-31 VITALS — DIASTOLIC BLOOD PRESSURE: 74 MMHG | SYSTOLIC BLOOD PRESSURE: 135 MMHG

## 2021-10-31 VITALS — DIASTOLIC BLOOD PRESSURE: 82 MMHG | SYSTOLIC BLOOD PRESSURE: 132 MMHG

## 2021-10-31 VITALS — SYSTOLIC BLOOD PRESSURE: 140 MMHG | DIASTOLIC BLOOD PRESSURE: 80 MMHG

## 2021-10-31 LAB
ALBUMIN SERPL BCP-MCNC: 2.5 G/DL (ref 3.4–5)
ANION GAP SERPL CALCULATED.3IONS-SCNC: 9 MMOL/L (ref 8–16)
BASOPHILS # BLD AUTO: 0 X10'3 (ref 0–0.2)
BASOPHILS NFR BLD AUTO: 0.7 % (ref 0–1)
BUN SERPL-MCNC: 6 MG/DL (ref 7–18)
BUN/CREAT SERPL: 8.7 (ref 6.6–38)
CALCIUM SERPL-MCNC: 7.9 MG/DL (ref 8.5–10.1)
CHLORIDE SERPL-SCNC: 110 MMOL/L (ref 99–107)
CO2 SERPL-SCNC: 26.1 MMOL/L (ref 24–32)
CREAT SERPL-MCNC: 0.69 MG/DL (ref 0.4–0.9)
EOSINOPHIL # BLD AUTO: 0.1 X10'3 (ref 0–0.9)
EOSINOPHIL NFR BLD AUTO: 2.1 % (ref 0–6)
ERYTHROCYTE [DISTWIDTH] IN BLOOD BY AUTOMATED COUNT: 13 % (ref 11.5–14.5)
GFR SERPL CREATININE-BSD FRML MDRD: 86 ML/MIN
GLUCOSE SERPL-MCNC: 86 MG/DL (ref 70–104)
HCT VFR BLD AUTO: 31 % (ref 35–45)
HGB BLD-MCNC: 10.5 G/DL (ref 12–16)
LYMPHOCYTES # BLD AUTO: 1.5 X10'3 (ref 1.1–4.8)
LYMPHOCYTES NFR BLD AUTO: 29.7 % (ref 21–51)
MCH RBC QN AUTO: 36.2 PG (ref 27–31)
MCHC RBC AUTO-ENTMCNC: 34 G/DL (ref 33–36.5)
MCV RBC AUTO: 106.5 FL (ref 78–98)
MONOCYTES # BLD AUTO: 0.6 X10'3 (ref 0–0.9)
MONOCYTES NFR BLD AUTO: 11.2 % (ref 2–12)
NEUTROPHILS # BLD AUTO: 2.8 X10'3 (ref 1.8–7.7)
NEUTROPHILS NFR BLD AUTO: 56.3 % (ref 42–75)
PLATELET # BLD AUTO: 231 X10'3 (ref 140–440)
PMV BLD AUTO: 7.3 FL (ref 7.4–10.4)
POTASSIUM SERPL-SCNC: 4 MMOL/L (ref 3.5–5.1)
RBC # BLD AUTO: 2.91 X10'6 (ref 4.2–5.6)
SODIUM SERPL-SCNC: 145 MMOL/L (ref 135–145)
WBC # BLD AUTO: 5.1 X10'3 (ref 4.5–11)

## 2021-10-31 PROCEDURE — 0DBL8ZZ EXCISION OF TRANSVERSE COLON, VIA NATURAL OR ARTIFICIAL OPENING ENDOSCOPIC: ICD-10-PCS | Performed by: INTERNAL MEDICINE

## 2021-10-31 PROCEDURE — 0DBN8ZZ EXCISION OF SIGMOID COLON, VIA NATURAL OR ARTIFICIAL OPENING ENDOSCOPIC: ICD-10-PCS | Performed by: INTERNAL MEDICINE

## 2021-10-31 RX ADMIN — LEVOFLOXACIN SCH MLS/HR: 500 INJECTION, SOLUTION INTRAVENOUS at 08:27

## 2021-10-31 RX ADMIN — SODIUM CHLORIDE SCH MLS/HR: 9 INJECTION INTRAMUSCULAR; INTRAVENOUS; SUBCUTANEOUS at 06:29

## 2021-10-31 RX ADMIN — DOCUSATE SODIUM SCH MG: 100 CAPSULE, LIQUID FILLED ORAL at 08:00

## 2021-10-31 NOTE — NUR
Patient in room PCU 3009. I have received report from KASEY ESTRADA   and had the opportunity 
to ask questions and assume patient care.

## 2021-10-31 NOTE — NUR
PAGER ID:  [phone] 

MESSAGE:  JADON ON [email or web address], CAN I ADVANCE 3009 BACK TO REGULAR DIET? SHE IS REQUESTING IT. 
THX. NO BLEED DETECTED WITH SCOP.E